# Patient Record
(demographics unavailable — no encounter records)

---

## 2024-12-16 NOTE — HISTORY OF PRESENT ILLNESS
[FreeTextEntry1] : This is a 68 y/o female with a pmhx of CAD, HFmEF, pAfib, HLD, HTN who presents for follow up. Last OV 8/01/2024. TTE 8/01/2024: EF 53%. Patient reported occasion SOB but has resolved at this time. Patient states that she feels like she is retaining water in her hands at this time. She denies SOB, tachycardia and change in diet. States that she is on a low sodium diet.  Patient denies chest pain, dyspnea, palpitations, dizziness, syncope, changes in bowel/bladder habits or appetite.

## 2025-04-28 NOTE — HISTORY OF PRESENT ILLNESS
[FreeTextEntry1] : This is a 69 y/o female with a pmhx of CAD, HFmEF, pAfib, HLD, HTN who presents for follow up. Last OV 12/16/2024, patient reported increased swelling in bilateral hands. Started on hctz 12.5mg twice/three times a week prn for swelling. Patient states that she has only used about 4 times but states that she feels a difference and states swelling has decreased.    Denies chest pain, palpitations, diaphoresis, vision changes, HA, dizziness, syncope, cough, wheezing, edema, fever, chills, infection.